# Patient Record
(demographics unavailable — no encounter records)

---

## 2024-12-06 NOTE — PHYSICAL EXAM
[Erythematous Oropharynx] : erythematous oropharynx [NL] : warm, clear [de-identified] : slight cervical LAD

## 2024-12-06 NOTE — HISTORY OF PRESENT ILLNESS
[de-identified] : sick [FreeTextEntry6] : 15y/o F p/w sore throat and head feels like it will hurt but then doesn't since this am.  Went to school nurse who said throat is red.  Two of her school friends are sick with vomiting and diarrhea and then sore throat.  Denies diarrhea, vomiting, abdominal pain.  Brother has diarrhea.  Pt not taking meds, no fever.

## 2024-12-06 NOTE — DISCUSSION/SUMMARY
[FreeTextEntry1] : Patient likely with viral pharyngitis. Rapid strep performed in office is negative. Recommend supportive care with antipyretics, salt water gargles, and if age-appropriate throat lozenges. - Return precautions reviewed. Patient to seek medical attention in ED if has decreased oral intake, decrease in wet diapers/voids, fever >100.4F, difficulty breathing, becomes lethargic, or has a change in mental status or alertness. To note if fever > 5 days must be seen immediately either in clinic or in ED. - Return/ED precautions given.  RTC routine and prn.  Caretaker expressed understanding and all questions answered.

## 2025-01-09 NOTE — HISTORY OF PRESENT ILLNESS
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

## 2025-01-09 NOTE — HISTORY OF PRESENT ILLNESS
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

## 2025-01-09 NOTE — DISCUSSION/SUMMARY
[Anticipatory Guidance Given] : Anticipatory guidance addressed as per the history of present illness section [Physical Growth and Development] : physical growth and development [Social and Academic Competence] : social and academic competence [Emotional Well-Being] : emotional well-being [Risk Reduction] : risk reduction [Violence and Injury Prevention] : violence and injury prevention [FreeTextEntry1] : KIM is a 14 year F presenting for WCC. Growth and development appropriate. HEADSSS completed, with teen privately.   WCC - Anticipatory guidance and routine care provided - RTC for next WCC and prn - Screening: Vision, Color Test, Hearing - Labs: urine only - Immunizations: - Referrals:   Caretaker expressed understanding of the plan and agrees. No other concerns or questions today.

## 2025-02-03 NOTE — DISCUSSION/SUMMARY
[FreeTextEntry1] : under breasts rash - mild yeast skin rash - nystatin for 7 days, if not improved, then rtc  URI - Recommend supportive care including antipyretics, fluids, OTC cough/cold medications if age-appropriate, and nasal saline followed by nasal suction. Alarming breathing patterns such as retractions discussed and educational video reviewed, as applicable. Patient to be brought to the ED if has persistent decreased oral intake, decrease in wet diapers, fever >100.4F or becomes patient becomes lethargic or changed in mental status and alertness. To note if fever > 5 days must be seen immediately either in clinic or in ED.  Continue supportive care. ED precautions reviewed. RTC routine and prn. Caretaker expressed understanding of the plan and agrees. No other concerns or questions today.

## 2025-02-03 NOTE — PHYSICAL EXAM
[Clear Rhinorrhea] : clear rhinorrhea [Nipple Discharge] : no nipple discharge [Normal Appearance] : normal appearance [NL] : warm, clear [FreeTextEntry4] : nasal congestion  [de-identified] : under breasts - erythema and patchy rash

## 2025-02-03 NOTE — HISTORY OF PRESENT ILLNESS
[FreeTextEntry6] : few days of cough, congestion, runny nose decreased po intake however trying to hydrate well   otc cough meds  no fever above 100.4F, vomiting, diarrhea, sob or difficulty breathing, joint pain or swelling, rash, urinary symptoms, headaches, ear pain or tugging  with rash on breasts, under breasts 1 week so far itchy however not painful  red and patchy wears sports bras with bands no nipple discharge